# Patient Record
Sex: FEMALE | Race: WHITE | NOT HISPANIC OR LATINO | Employment: PART TIME | URBAN - METROPOLITAN AREA
[De-identification: names, ages, dates, MRNs, and addresses within clinical notes are randomized per-mention and may not be internally consistent; named-entity substitution may affect disease eponyms.]

---
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
## 2024-03-05 ENCOUNTER — OFFICE VISIT (OUTPATIENT)
Dept: URGENT CARE | Facility: PHYSICIAN GROUP | Age: 19
End: 2024-03-05
Payer: COMMERCIAL

## 2024-03-05 VITALS
WEIGHT: 142 LBS | HEIGHT: 67 IN | HEART RATE: 82 BPM | RESPIRATION RATE: 16 BRPM | OXYGEN SATURATION: 98 % | TEMPERATURE: 98 F | DIASTOLIC BLOOD PRESSURE: 62 MMHG | BODY MASS INDEX: 22.29 KG/M2 | SYSTOLIC BLOOD PRESSURE: 116 MMHG

## 2024-03-05 DIAGNOSIS — R42 LIGHTHEADEDNESS: ICD-10-CM

## 2024-03-05 DIAGNOSIS — R11.2 NAUSEA AND VOMITING, UNSPECIFIED VOMITING TYPE: ICD-10-CM

## 2024-03-05 DIAGNOSIS — K52.9 GASTROENTERITIS: ICD-10-CM

## 2024-03-05 PROCEDURE — 3078F DIAST BP <80 MM HG: CPT | Performed by: FAMILY MEDICINE

## 2024-03-05 PROCEDURE — 3074F SYST BP LT 130 MM HG: CPT | Performed by: FAMILY MEDICINE

## 2024-03-05 PROCEDURE — 99213 OFFICE O/P EST LOW 20 MIN: CPT | Performed by: FAMILY MEDICINE

## 2024-03-05 RX ORDER — ONDANSETRON 4 MG/1
4 TABLET, FILM COATED ORAL EVERY 4 HOURS PRN
Qty: 20 TABLET | Refills: 0 | Status: SHIPPED | OUTPATIENT
Start: 2024-03-05

## 2024-03-05 RX ORDER — LEVONORGESTREL AND ETHINYL ESTRADIOL 150-30(84)
1 KIT ORAL
COMMUNITY
Start: 2024-02-27

## 2024-03-05 ASSESSMENT — ENCOUNTER SYMPTOMS
CARDIOVASCULAR NEGATIVE: 1
EYES NEGATIVE: 1
DIARRHEA: 1
MUSCULOSKELETAL NEGATIVE: 1
NAUSEA: 1
RESPIRATORY NEGATIVE: 1
VOMITING: 1

## 2024-03-05 NOTE — PROGRESS NOTES
"Subjective:   Isela Swan is a 18 y.o. female who presents for Other (X 1 wk light headed, fatigue. Pt states she feels like she might faint)      Patient with N/V and diarrhea, decrease fluid intake, complains of light headedness.    Other  Associated symptoms include nausea and vomiting.       Review of Systems   Constitutional:  Positive for malaise/fatigue.   HENT: Negative.     Eyes: Negative.    Respiratory: Negative.     Cardiovascular: Negative.    Gastrointestinal:  Positive for diarrhea, nausea and vomiting.   Genitourinary: Negative.    Musculoskeletal: Negative.        Medications, Allergies, and current problem list reviewed today in Epic.     Objective:     /62   Pulse 82   Temp 36.7 °C (98 °F) (Temporal)   Resp 16   Ht 1.702 m (5' 7\")   Wt 64.4 kg (142 lb)   SpO2 98%     Physical Exam    Assessment/Plan:     Diagnosis and associated orders:     1. Gastroenteritis        2. Lightheadedness        3. Nausea and vomiting, unspecified vomiting type  ondansetron (ZOFRAN) 4 MG Tab tablet         Comments/MDM:     Fluid replacement         Differential diagnosis, natural history, supportive care, and indications for immediate follow-up discussed.    Advised the patient to follow-up with the primary care physician for recheck, reevaluation, and consideration of further management.    Please note that this dictation was created using voice recognition software. I have made a reasonable attempt to correct obvious errors, but I expect that there are errors of grammar and possibly content that I did not discover before finalizing the note.    This note was electronically signed by Jd Geiger M.D.  "